# Patient Record
Sex: MALE | Race: WHITE
[De-identification: names, ages, dates, MRNs, and addresses within clinical notes are randomized per-mention and may not be internally consistent; named-entity substitution may affect disease eponyms.]

---

## 2017-05-31 ENCOUNTER — HOSPITAL ENCOUNTER (EMERGENCY)
Dept: HOSPITAL 25 - UCEAST | Age: 22
Discharge: HOME | End: 2017-05-31
Payer: COMMERCIAL

## 2017-05-31 DIAGNOSIS — S30.860A: Primary | ICD-10-CM
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
## 2017-05-31 DIAGNOSIS — F12.90: ICD-10-CM

## 2017-05-31 PROCEDURE — G0463 HOSPITAL OUTPT CLINIC VISIT: HCPCS

## 2017-05-31 PROCEDURE — 99212 OFFICE O/P EST SF 10 MIN: CPT

## 2017-05-31 NOTE — UC
Skin Complaint HPI





- HPI Summary


HPI Summary: 





21 male presents with complaints of a tick bite on left buttock that occurred ~2

-3 days ago and he noticed it yesterday 5/30/17. Patient states he did try and 

remove it however thinks the head is still intact. Patient admits to some 

redness, denies and bullseye rash. Minimal pain and tenderness to the area. No 

joint pains or headache. No fever chills. Never been bit by tick before.





- History of Current Complaint


Chief Complaint: UCSkin


Time Seen by Provider: 05/31/17 15:58


Stated Complaint: TICK BITE


Hx Obtained From: Patient


Onset/Duration: Sudden Onset, Lasting Days


Skin Exposure Onset/Duration: Days Ago


Onset Severity: Mild


Current Severity: Mild


Pain Intensity: 0


Pain Scale Used: 0-10 Numeric


Location: Other - left buttock


Character: Redness, Painful


Aggravating: Nothing


Alleviating: Treatment PTA: - tried to remove however head still intact


Associated Signs & Symptoms: Positive: Negative


Related History: Insect Bite/Sting - tick





- Allergy/Home Medications


Allergies/Adverse Reactions: 


 Allergies











Allergy/AdvReac Type Severity Reaction Status Date / Time


 


No Known Allergies Allergy   Verified 05/31/17 15:29











Home Medications: 


 Home Medications





NK [No Home Medications Reported]  05/31/17 [History Confirmed 05/31/17]











Review of Systems


Constitutional: Negative


Skin: Other - tick bite, redness


ENT: Negative


Respiratory: Negative


Cardiovascular: Negative


Gastrointestinal: Negative


Neurological: Negative


All Other Systems Reviewed And Are Negative: Yes





PMH/Surg Hx/FS Hx/Imm Hx





- Additional Past Medical History


Additional PMH: 





none: denies asthma, htn, dm





- Surgical History


Surgical History: Yes


Surgery Procedure, Year, and Place: WISDOM TEETH EXTRACTION





- Family History


Known Family History: Positive: None





- Social History


Alcohol Use: Occasionally


Substance Use Type: Marijuana


Smoking Status (MU): Never Smoked Tobacco





- Immunization History


Vaccination Up to Date: Yes





Physical Exam


Triage Information Reviewed: Yes


Appearance: Well-Appearing, No Pain Distress, Well-Nourished


Vital Signs: 


 Initial Vital Signs











Temp  98.1 F   05/31/17 15:26


 


Pulse  60   05/31/17 15:26


 


Resp  16   05/31/17 15:26


 


BP  109/61   05/31/17 15:26


 


Pulse Ox  100   05/31/17 15:26











Vital Signs Reviewed: Yes


Eyes: Positive: Conjunctiva Clear


ENT: Positive: Normal ENT inspection, Hearing grossly normal


Neck: Positive: Supple, Nontender, No Lymphadenopathy


Respiratory: Positive: Chest non-tender, Lungs clear, Normal breath sounds, No 

respiratory distress, No accessory muscle use


Cardiovascular: Positive: RRR, No Murmur, Pulses Normal, Brisk Capillary Refill


Musculoskeletal: Positive: Strength Intact, ROM Intact, No Edema


Neurological: Positive: Alert - sensation intact, Muscle Tone Normal


Skin: Positive: Other - tick bite with head still intact of left buttock. was 

removed with 18gauge needels and forceps without complication. no sign of 

erythema migrans, no concern of lyme disease at this time. minimal surrounding 

erythema. 3mm in diameter





Course/Dx





- Course


Course Of Treatment: tick head was removed from left buttock without 

complication. No further signs of infection or lyme disease. Given prophylactic 

dose of doxycycline due to length of time attached and removed. Educated on 

lyme disease. Aware of worsening signs and symptoms. Follow up with PCP.





- Differential Diagnoses - Skin Complaint


Differential Diagnoses: Cellulitis, Contact Dermatitis, Tick Born Illness, Tinea





- Diagnoses


Provider Diagnoses: tick bite





Discharge





- Discharge Plan


Condition: Stable


Disposition: HOME


Patient Education Materials:  Lyme Disease (ED), Tick Bite (ED), Doxycycline (

By mouth)


Referrals: 


Tiana Hutchison DO [Primary Care Provider] - 


Additional Instructions: 


Watch for signs of bullseye rash, joint pain, headache, fever/chills and if you 

develop these symptoms seek medical attention promptly.


Keep area of bite clean and dry.


Doxycycline can make you sensitive of the sun so wear sunscreen and take 

precautions.


Wear bug spray with DEET when outside and wear clothes to cover exposed skin. 


Follow up with PCP.

## 2018-06-03 ENCOUNTER — HOSPITAL ENCOUNTER (EMERGENCY)
Dept: HOSPITAL 25 - UCEAST | Age: 23
Discharge: HOME | End: 2018-06-03
Payer: SELF-PAY

## 2018-06-03 DIAGNOSIS — Z72.0: ICD-10-CM

## 2018-06-03 DIAGNOSIS — J20.9: Primary | ICD-10-CM

## 2018-06-03 PROCEDURE — 99212 OFFICE O/P EST SF 10 MIN: CPT

## 2018-06-03 PROCEDURE — G0463 HOSPITAL OUTPT CLINIC VISIT: HCPCS

## 2018-06-03 NOTE — UC
Respiratory Complaint HPI





- HPI Summary


HPI Summary: 





2 weeks of cough and wheeze, thick sputum no nasal drainage





- History of Current Complaint


Chief Complaint: UCRespiratory


Stated Complaint: RESP COMPLAINT


Time Seen by Provider: 06/03/18 13:35


Hx Obtained From: Patient


Onset/Duration: Sudden Onset, Lasting Weeks - 2, Still Present


Timing: Constant


Pain Intensity: 0


Pain Scale Used: 0-10 Numeric


Character: Cough: Productive


Aggravating Factors: Nothing


Alleviating Factors: Nothing


Associated Signs And Symptoms: Positive: Pleuritic Chest Pain, Wheezing, URI





- Allergies/Home Medications


Allergies/Adverse Reactions: 


 Allergies











Allergy/AdvReac Type Severity Reaction Status Date / Time


 


No Known Allergies Allergy   Verified 06/03/18 13:12














PMH/Surg Hx/FS Hx/Imm Hx


Previously Healthy: Yes





- Surgical History


Surgical History: Yes


Surgery Procedure, Year, and Place: WISDOM TEETH EXTRACTION





- Family History


Known Family History: Positive: None





- Social History


Occupation: Student


Lives: With Family


Alcohol Use: Rare


Substance Use Type: None


Smoking Status (MU): Current Some Day Smoker


Type: Cigarettes





- Immunization History


Vaccination Up to Date: Yes





Review of Systems


Constitutional: Negative


Skin: Negative


Eyes: Negative


ENT: Negative


Respiratory: Cough


Cardiovascular: Negative


Gastrointestinal: Negative


Genitourinary: Negative


Motor: Negative


Neurovascular: Negative


Musculoskeletal: Negative


Neurological: Negative


Psychological: Negative


Is Patient Immunocompromised?: No


All Other Systems Reviewed And Are Negative: Yes





Physical Exam


Triage Information Reviewed: Yes


Appearance: Well-Appearing, No Pain Distress, Well-Nourished


Vital Signs: 


 Initial Vital Signs











Temp  97.8 F   06/03/18 13:10


 


Pulse  59   06/03/18 13:10


 


Resp  18   06/03/18 13:10


 


BP  121/45   06/03/18 13:10


 


Pulse Ox  100   06/03/18 13:10











Vital Signs Reviewed: Yes


Eye Exam: Normal


Eyes: Positive: Conjunctiva Clear


ENT Exam: Normal


ENT: Positive: Normal ENT inspection, Hearing grossly normal, Pharynx normal, 

TMs normal, Uvula midline.  Negative: Nasal congestion, Tonsillar swelling, 

Tonsillar exudate, Trismus, Muffled voice, Hoarse voice, Dental tenderness, 

Sinus tenderness


Dental Exam: Normal


Neck exam: Normal


Neck: Positive: 1


Respiratory Exam: Normal


Respiratory: Positive: Chest non-tender, Lungs clear, No accessory muscle use, 

Wheezing


Cardiovascular Exam: Normal


Cardiovascular: Positive: RRR, No Murmur, Pulses Normal, Brisk Capillary Refill


Musculoskeletal Exam: Normal


Musculoskeletal: Positive: Strength Intact, ROM Intact, No Edema


Neurological Exam: Normal


Neurological: Positive: Alert, Muscle Tone Normal


Psychological Exam: Normal


Skin Exam: Normal





[facility] Diagnostic Evaluation





- Laboratory


O2 Sat by Pulse Oximetry: 100





Respiratory Course/Dx





- Course


Course Of Treatment: zithromax, albuterol increase fluids follow with pcp prn





- Differential Dx/Diagnosis


Provider Diagnoses: Acute bronchitis with bronchospasm





Discharge





- Sign-Out/Discharge


Documenting (check all that apply): Discharge/Admit/Transfer





- Discharge Plan


Condition: Stable


Disposition: HOME


Prescriptions: 


Albuterol HFA INHALER* [Ventolin HFA Inhaler*] 2 puff INH Q4H PRN #1 mdi


 PRN Reason: cough/wheeze


Azithromycin TAB* [Zithromax TAB (Z-GRAY) 250 mg #6 tabs] 2 tab PO .TODAY, THEN 

1 DAILY #1 gray


Patient Education Materials:  How to Use a Metered-Dose Inhaler (ED), 

Bronchospasm (ED), Acute Cough (ED)


Referrals: 


John Palumbo MD [Medical Doctor] - If Needed





- Billing Disposition and Condition


Condition: STABLE


Disposition: HOME